# Patient Record
Sex: FEMALE | Race: WHITE | NOT HISPANIC OR LATINO | URBAN - METROPOLITAN AREA
[De-identification: names, ages, dates, MRNs, and addresses within clinical notes are randomized per-mention and may not be internally consistent; named-entity substitution may affect disease eponyms.]

---

## 2021-04-28 ENCOUNTER — PREPPED CHART (OUTPATIENT)
Dept: URBAN - METROPOLITAN AREA CLINIC 49 | Facility: CLINIC | Age: 46
End: 2021-04-28

## 2021-04-30 ENCOUNTER — NEW PATIENT ROUTINE/VISION (OUTPATIENT)
Dept: URBAN - METROPOLITAN AREA CLINIC 49 | Facility: CLINIC | Age: 46
End: 2021-04-30

## 2021-04-30 DIAGNOSIS — H52.203: ICD-10-CM

## 2021-04-30 DIAGNOSIS — Z01.00: ICD-10-CM

## 2021-04-30 PROCEDURE — 92004 COMPRE OPH EXAM NEW PT 1/>: CPT

## 2021-04-30 PROCEDURE — 92015 DETERMINE REFRACTIVE STATE: CPT

## 2021-04-30 ASSESSMENT — VISUAL ACUITY
OD_CC: 20/20
OU_SC: J1+
OS_SC: J1+
OS_SC: 20/400
OS_CC: 20/20
OD_SC: 20/400
OD_SC: J1+

## 2021-04-30 ASSESSMENT — TONOMETRY
OD_IOP_MMHG: 16
OS_IOP_MMHG: 16

## 2021-04-30 ASSESSMENT — KERATOMETRY
OD_K2POWER_DIOPTERS: 44.00
OS_AXISANGLE2_DEGREES: 162
OD_AXISANGLE_DEGREES: 090
OS_AXISANGLE_DEGREES: 072
OD_K1POWER_DIOPTERS: 44.00
OS_K1POWER_DIOPTERS: 44.25
OD_AXISANGLE2_DEGREES: 180
OS_K2POWER_DIOPTERS: 44.00

## 2021-04-30 NOTE — PATIENT DISCUSSION
Patient is a previous contact lens wearer, will schedule an appointment at her convenience. Patient has worn daily lenses from [facility].